# Patient Record
Sex: MALE | Race: BLACK OR AFRICAN AMERICAN
[De-identification: names, ages, dates, MRNs, and addresses within clinical notes are randomized per-mention and may not be internally consistent; named-entity substitution may affect disease eponyms.]

---
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
## 2019-11-27 ENCOUNTER — HOSPITAL ENCOUNTER (EMERGENCY)
Dept: HOSPITAL 62 - ER | Age: 24
Discharge: HOME | End: 2019-11-27
Payer: COMMERCIAL

## 2019-11-27 VITALS — DIASTOLIC BLOOD PRESSURE: 66 MMHG | SYSTOLIC BLOOD PRESSURE: 140 MMHG

## 2019-11-27 DIAGNOSIS — R09.81: ICD-10-CM

## 2019-11-27 DIAGNOSIS — R09.82: ICD-10-CM

## 2019-11-27 DIAGNOSIS — J35.1: ICD-10-CM

## 2019-11-27 DIAGNOSIS — R09.89: ICD-10-CM

## 2019-11-27 DIAGNOSIS — J02.9: ICD-10-CM

## 2019-11-27 DIAGNOSIS — J34.89: ICD-10-CM

## 2019-11-27 DIAGNOSIS — B27.90: Primary | ICD-10-CM

## 2019-11-27 DIAGNOSIS — H92.09: ICD-10-CM

## 2019-11-27 DIAGNOSIS — R05: ICD-10-CM

## 2019-11-27 DIAGNOSIS — J45.909: ICD-10-CM

## 2019-11-27 PROCEDURE — 87880 STREP A ASSAY W/OPTIC: CPT

## 2019-11-27 PROCEDURE — 86308 HETEROPHILE ANTIBODY SCREEN: CPT

## 2019-11-27 PROCEDURE — 36415 COLL VENOUS BLD VENIPUNCTURE: CPT

## 2019-11-27 PROCEDURE — 99283 EMERGENCY DEPT VISIT LOW MDM: CPT

## 2019-11-27 PROCEDURE — 87070 CULTURE OTHR SPECIMN AEROBIC: CPT

## 2019-11-27 NOTE — ER DOCUMENT REPORT
ED ENT





- General


Chief Complaint: Sore Throat


Stated Complaint: THROAT PAIN


Time Seen by Provider: 11/27/19 19:50


Mode of Arrival: Ambulatory


Information source: Patient


Notes: 





24-year-old male presented to ED for complaint of sore throat runny nose cough 

congestion he has been to the primary care multiple times.  He states that he 

took steroids Friday Saturday and Sunday and then started amoxicillin on Monday.

 He states he is continuing to have a sore throat so he came to the emergency 

room.  He states he is was taking ibuprofen and Tylenol and switch to ibuprofen 

and Advil I have explained to him that ibuprofen and Advil at the same medicine.


TRAVEL OUTSIDE OF THE [place] IN LAST 30 DAYS: No





- HPI


Patient complains to provider of: Ear problem, Nose problem, Throat problem


Onset: Last week


Onset/Duration: Persistent


Quality of pain: Achy, Sharp


Severity: Moderate


Pain Level: 4


Context: Recent Illness


Location of pain: Ears, Nose, Sinus, Throat


Associated symptoms: Ear pain, Runny nose, Sinus pain, Sinus drainage, Sore 

throat, Swollen glands


Similar symptoms previously: Yes


Recently seen / treated by doctor: Yes





- Related Data


Allergies/Adverse Reactions: 


                                        





No Known Allergies Allergy (Unverified 11/27/19 19:54)


   











Past Medical History





- General


Information source: Patient





- Social History


Smoking Status: Never Smoker


Frequency of alcohol use: None


Drug Abuse: None


Occupation: [employer]


Lives with: Alone


Family History: Reviewed & Not Pertinent


Patient has suicidal ideation: No


Patient has homicidal ideation: No





- Past Medical History


Cardiac Medical History: Reports: None


Pulmonary Medical History: Reports: Hx Asthma


EENT Medical History: Reports: None


Neurological Medical History: Reports: None


Endocrine Medical History: Reports: None


Malignancy Medical History: Reports None


GI Medical History: Reports: None


Musculoskeletal Medical History: Reports None


Skin Medical History: Reports None


Psychiatric Medical History: Reports: None


Traumatic Medical History: Reports: None


Infectious Medical History: Reports: None


Surgical Hx: Negative


Past Surgical History: Reports: None





- Immunizations


Immunizations up to date: Yes


Hx Diphtheria, Pertussis, Tetanus Vaccination: Yes





Review of Systems





- Review of Systems


Constitutional: No symptoms reported


EENT: Ear pain, Nose congestion, Nose discharge, Sinus pressure, Throat pain


Cardiovascular: No symptoms reported


Respiratory: No symptoms reported


Gastrointestinal: No symptoms reported


Genitourinary: No symptoms reported


Male Genitourinary: No symptoms reported


Musculoskeletal: No symptoms reported


Skin: No symptoms reported


Hematologic/Lymphatic: No symptoms reported


Neurological/Psychological: No symptoms reported


-: Yes All other systems reviewed and negative





Physical Exam





- Vital signs


Vitals: 


                                        











Temp Pulse Resp BP Pulse Ox


 


 98.6 F   69   16   133/72 H  100 


 


 11/27/19 19:50  11/27/19 19:50  11/27/19 19:50  11/27/19 19:50  11/27/19 19:50











Interpretation: Normal





- General


General appearance: Appears well, Alert





- HEENT


Head: Normocephalic, Atraumatic


Eyes: Normal


Pupils: PERRL


Ears: Normal


External canal: Normal


Tympanic membrane: Normal


Sinus: Normal


Nasal: Purulent discharge, Swelling


Mouth/Lips: Normal


Mucous membranes: Normal


Pharynx: Erythema, Exudate, Post nasal drainage, Tonsillar hypertrophy


Neck: Anterior cervical chain





- Respiratory


Respiratory status: No respiratory distress


Chest status: Nontender


Breath sounds: Nonproductive cough.  No: Rales, Rhonchi, Stridor


Chest palpation: Normal





- Cardiovascular


Rhythm: Regular


Heart sounds: Normal auscultation


Murmur: No





- Abdominal


Inspection: Normal


Distension: No distension


Bowel sounds: Normal


Tenderness: Nontender


Organomegaly: No organomegaly





- Back


Back: Normal, Nontender





- Extremities


General upper extremity: Normal inspection, Nontender, Normal color, Normal ROM,

Normal temperature


General lower extremity: Normal inspection, Nontender, Normal color, Normal ROM,

Normal temperature, Normal weight bearing.  No: Hayden's sign





- Neurological


Neuro grossly intact: Yes


Cognition: Normal


Orientation: AAOx4


Halfway Coma Scale Eye Opening: Spontaneous


Halfway Coma Scale Verbal: Oriented


Halfway Coma Scale Motor: Obeys Commands


Naomi Coma Scale Total: 15


Speech: Normal


Motor strength normal: LUE, RUE, LLE, RLE


Sensory: Normal





- Psychological


Associated symptoms: Normal affect, Normal mood





- Skin


Skin Temperature: Warm


Skin Moisture: Dry


Skin Color: Normal





Course





- Re-evaluation


Re-evalutation: 





11/27/19 21:04


Patient has been diagnosed with mono.  He is already been on steroids and 

antibiotics.  I have discussed with him mouthwash gargling with salt and soda 

Tylenol and Motrin and follow-up with his primary doctor.  I have also discussed

with him no physical contact sports if he has any pain to his left flank area.  

Patient verbalized understanding and agreement with treatment plan patient was 

discharged home.





- Vital Signs


Vital signs: 


                                        











Temp Pulse Resp BP Pulse Ox


 


 98.6 F   69   16   133/72 H  100 


 


 11/27/19 19:50  11/27/19 19:50  11/27/19 19:50  11/27/19 19:50  11/27/19 19:50














- Laboratory


Laboratory results interpreted by me: 


                                        











  11/27/19





  20:02


 


Monotest  POSITIVE H














Discharge





- Discharge


Clinical Impression: 


Mononucleosis


Qualifiers:


 Infectious mononucleosis etiology: unspecified organism Infectious 

mononucleosis complication: without complication Qualified Code(s): B27.90 - 

Infectious mononucleosis, unspecified without complication





Condition: Stable


Disposition: HOME, SELF-CARE


Instructions:  Family Physicians / Practices


Additional Instructions: 


Mononucleosis





     You have been diagnosed as having mononucleosis ("mono"). This is a viral 

infection which often lasts several weeks. Typically, a week or two of tiredness

precedes a sore throat, swollen glands, fever, and aches.  Sometimes there's a 

rash. In severe cases, swollen spleen and liver develop.


     There is no cure for mononucleosis.  You should rest, drink plenty of 

fluids, and avoid contact sports until you are better.  A follow-up examination 

is usually done in about a week.  Further laboratory testing may be necessary 

then.


     See the doctor if there is significant worsening of the symptoms or onset 

of new symptoms such as severe headache, stiff neck, generalized abdominal pain,

or faintness.





Acetaminophen





     Acetaminophen may be taken for pain relief or fever control. It's much 

safer than aspirin, offering a wider range of "safe" dosages.  It is safe during

pregnancy.  Some brand names are Tylenol, Panadol, Datril, Anacin 3, Tempra, and

Liquiprin. Acetaminophen can be repeated every four hours.  The following are 

maximum recommended dosages:





WEIGHT         Dose             Drops                  Elixir        

Chewable(80mg)


(LBS.)                            drprs=droppers    tsp=teaspoon


6                 40 mg            .4 ml (1/2)


6-11            80 mg            .8 ml (full)            1/2   tsp           1  

    tab


12-16         120 mg           1 1/2 drprs            3/4   tsp           1 1/2 

tabs


17-23         160 mg             2  drprs              1      tsp           2   

   tabs


24-30         240 mg             3  drprs              1 1/2 tsp           3    

  tabs


30-35         320 mg                                     2       tsp           4

      tabs


36-41         360 mg                                     2 1/4 tsp           4 

1/2  tabs


42-47         400 mg                                     2 1/2 tsp           5  

    tabs


48-53         480 mg                                     3       tsp          6 

     tabs


54-59         520 mg                                     3  1/4 tsp          6 

1/2 tabs


60-64         560 mg                                     3  1/2 tsp          7  

   tabs 


65-70         600 mg                                     3  3/4 tsp          7 

1/2 tabs


71-76         640 mg                                     4       tsp           8

     tabs


77-82         720 mg                                     4 1/2  tsp           9 

    tabs


83-88         800 mg                                     5       tsp           1

0     tabs





>89 pounds or adults          650 mg to 900 mg 





Acetaminophen can be repeated every four hours. Maximum daily dose not to exceed

4000 mg.





   These maximum recommended dosages are slightly higher than the dosages 

written on the product container, but these dosages are very safe and well below

the toxic dosage for acetaminophen.








Ibuprofen





     Ibuprofen is an excellent, safe drug for pain control.  In addition, it has

potent antiinflammatory effects which are beneficial, especially in the 

treatment of injuries, arthritis, or tendonitis. It's best to take ibuprofen 

with food.  Persons with ulcer disease or allergy to aspirin should notify their

physician of this before taking ibuprofen.


     Take the medication exactly as prescribed.  Don't take additional doses 

unless instructed to do so by your doctor.  If you develop wheezing, shortness 

of breath, hives, faintness, stomach pain, vomiting, or dark black stools, 

return for re-evaluation at once.





Salt and soda solution


1 quart of water


1 tablespoon of salt


1 teaspoon of baking soda


Mixed 3 ingredients together and boil for 1 minute


Placed in a covered quart jar


Use 1/2 ounce of cold solution to gargle 3 times a day





FOLLOW-UP CARE:


If you have been referred to a physician for follow-up care, call the 

physicians office for an appointment as you were instructed or within the next 

two days.  If you experience worsening or a significant change in your symptoms,

notify the physician immediately or return to the Emergency Department at any 

time for re-evaluation.